# Patient Record
Sex: FEMALE | Race: WHITE | Employment: OTHER | URBAN - METROPOLITAN AREA
[De-identification: names, ages, dates, MRNs, and addresses within clinical notes are randomized per-mention and may not be internally consistent; named-entity substitution may affect disease eponyms.]

---

## 2018-12-05 ENCOUNTER — HOSPITAL ENCOUNTER (OUTPATIENT)
Dept: MAMMOGRAPHY | Age: 78
Discharge: HOME | End: 2018-12-05
Attending: INTERNAL MEDICINE
Payer: MEDICARE

## 2018-12-05 DIAGNOSIS — M89.9 DISORDER OF BONE, UNSPECIFIED: ICD-10-CM

## 2018-12-05 PROCEDURE — 77080 DXA BONE DENSITY AXIAL: CPT

## 2019-03-15 RX ORDER — ZOLEDRONIC ACID 5 MG/100ML
5 INJECTION, SOLUTION INTRAVENOUS ONCE
Status: DISCONTINUED | OUTPATIENT
Start: 2019-03-20 | End: 2019-03-20

## 2019-03-20 ENCOUNTER — HOSPITAL ENCOUNTER (OUTPATIENT)
Dept: INFUSION THERAPY | Age: 79
Discharge: HOME | End: 2019-03-20

## 2019-03-20 VITALS
SYSTOLIC BLOOD PRESSURE: 153 MMHG | OXYGEN SATURATION: 100 % | RESPIRATION RATE: 18 BRPM | TEMPERATURE: 97.8 F | HEART RATE: 76 BPM | DIASTOLIC BLOOD PRESSURE: 81 MMHG

## 2019-03-20 RX ORDER — LEVOTHYROXINE SODIUM 25 UG/1
40 TABLET ORAL
COMMUNITY
End: 2019-08-02 | Stop reason: SDUPTHER

## 2019-03-20 RX ORDER — OMEPRAZOLE 40 MG/1
40 CAPSULE, DELAYED RELEASE ORAL DAILY
Status: ON HOLD | COMMUNITY
End: 2022-10-03

## 2019-03-20 NOTE — PROGRESS NOTES
Art SMITH SHORT VISIT NOTE    12 Pt arrived to [facility] ambulatory and in no distress for Reclast.  Denies any new complaints. Unable to obtain a peripheral IV. Encouraged pt to drink plenty of water the day before her infusion. Pt rescheduled for April 4. Patient Vitals for the past 12 hrs:   Temp Pulse Resp BP SpO2   03/20/19 1114 97.8 °F (36.6 °C) 76 18 153/81 100 %     1135 Discharged home ambulatory and in no distress. Tolerated treatment well.  Next appointment 4/4/19 @ 130pm

## 2019-03-22 ENCOUNTER — HOSPITAL ENCOUNTER (OUTPATIENT)
Dept: GENERAL RADIOLOGY | Age: 79
Discharge: HOME | End: 2019-03-22
Attending: INTERNAL MEDICINE
Payer: MEDICARE

## 2019-03-22 DIAGNOSIS — R13.10 DYSPHAGIA: ICD-10-CM

## 2019-03-22 PROCEDURE — 92611 MOTION FLUOROSCOPY/SWALLOW: CPT

## 2019-03-22 PROCEDURE — 74230 X-RAY XM SWLNG FUNCJ C+: CPT

## 2019-03-22 NOTE — PROGRESS NOTES
[facility]  [address], [address]    Speech Pathology Modified barium swallow Study  Patient: Radha Castellano (86 y.o. female)  Date: 3/22/2019  Referring Provider:  Brian Dutta:   Patient c/o a few months of nocturnal wakenings with gasping for air and mucous, choking on various foods that often include vinegar, crumbly foods. R/o regurgitation of acid at times. She had EGD that showed small Hiatal hernia  And Mcgraw's esophagus. She is working on compensatory strategies for GERD and taking PPI. OBJECTIVE:   Past Medical History: her dad had PD with dysphagia at end of life. No past medical history on file. No past surgical history on file. Current Dietary Status:  Regular, thins  Radiologist: Augie Amos  Film Views: Lateral  Patient Position: upright standing    Trial 1: Trial 2:   Consistency Presented: All consistencies; Thin liquid; Solid;Puree;Pill/Tablet     How Presented: SLP-fed/presented;Straw;Successive swallows      ORAL PHASE:      Bolus Acceptance: No impairment     Bolus Formation/Control: No impairment:    :     Propulsion: No impairment     Oral Residue: None   PHARYNGEAL PHASE:      Initiation of Swallow: No impairment     Timing: No impairment     Penetration: None     Aspiration/Timing: No evidence of aspiration     Pharyngeal Clearance: No residue                               Trial 3: Trial 4:                            :    :                                                                        Decreased Tongue Base Retraction?: No  Laryngeal Elevation: WFL (within functional limits)  Aspiration/Penetration Score: 1 (No penetration or aspiration-Contrast does not enter the airway)                     ASSESSMENT :  Based on the objective data described above, the patient presents with normal oral-pharyngeal swallow. Her c/o dysphagia are highly correlated with GERD, which has been confirmed by GI MD. . PLAN/RECOMMENDATIONS :  Diet as tolerated. We discussed various compensatory strategies for GERD and recommended she continue to read GERD information packet provided by GI.       COMMUNICATION/EDUCATION:   The above findings and recommendations were discussed with: patient who verbalized understanding.     Thank you for this referral.  Tom To, SLP  Time Calculation: 20 mins

## 2019-04-01 RX ORDER — ZOLEDRONIC ACID 5 MG/100ML
5 INJECTION, SOLUTION INTRAVENOUS ONCE
Status: COMPLETED | OUTPATIENT
Start: 2019-04-04 | End: 2019-04-04

## 2019-04-04 ENCOUNTER — HOSPITAL ENCOUNTER (OUTPATIENT)
Dept: INFUSION THERAPY | Age: 79
Discharge: HOME | End: 2019-04-04
Payer: MEDICARE

## 2019-04-04 VITALS
HEIGHT: 60 IN | RESPIRATION RATE: 18 BRPM | WEIGHT: 178 LBS | HEART RATE: 80 BPM | BODY MASS INDEX: 34.95 KG/M2 | SYSTOLIC BLOOD PRESSURE: 131 MMHG | TEMPERATURE: 98.1 F | DIASTOLIC BLOOD PRESSURE: 63 MMHG

## 2019-04-04 LAB
ALBUMIN SERPL-MCNC: 3.6 G/DL (ref 3.5–5)
ANION GAP SERPL CALC-SCNC: 5 MMOL/L (ref 5–15)
BUN SERPL-MCNC: 22 MG/DL (ref 6–20)
BUN/CREAT SERPL: 24 (ref 12–20)
CALCIUM SERPL-MCNC: 8.8 MG/DL (ref 8.5–10.1)
CHLORIDE SERPL-SCNC: 107 MMOL/L (ref 97–108)
CO2 SERPL-SCNC: 26 MMOL/L (ref 21–32)
CREAT SERPL-MCNC: 0.9 MG/DL (ref 0.55–1.02)
GLUCOSE SERPL-MCNC: 117 MG/DL (ref 65–100)
PHOSPHATE SERPL-MCNC: 3.6 MG/DL (ref 2.6–4.7)
POTASSIUM SERPL-SCNC: 4.3 MMOL/L (ref 3.5–5.1)
SODIUM SERPL-SCNC: 138 MMOL/L (ref 136–145)

## 2019-04-04 PROCEDURE — 36415 COLL VENOUS BLD VENIPUNCTURE: CPT

## 2019-04-04 PROCEDURE — 80069 RENAL FUNCTION PANEL: CPT

## 2019-04-04 PROCEDURE — 74011250636 HC RX REV CODE- 250/636: Performed by: INTERNAL MEDICINE

## 2019-04-04 PROCEDURE — 96365 THER/PROPH/DIAG IV INF INIT: CPT

## 2019-04-04 RX ADMIN — ZOLEDRONIC ACID 5 MG: 5 INJECTION, SOLUTION INTRAVENOUS at 15:02

## 2019-04-04 NOTE — PROGRESS NOTES
[facility] VISIT NOTE 
 
1330 Pt arrived at Health system ambulatory and in no distress for Reclast.  Assessment completed, pt w/o complaints. PIV LFA 24 G + blood return. Positive blood return noted and labs drawn. Medications received: 
Reclast IV Tolerated treatment well, no adverse reaction noted. PIV removed gauze and band aid applied. Recent Results (from the past 12 hour(s)) RENAL FUNCTION PANEL Collection Time: 04/04/19  2:02 PM  
Result Value Ref Range Sodium 138 136 - 145 mmol/L Potassium 4.3 3.5 - 5.1 mmol/L Chloride 107 97 - 108 mmol/L  
 CO2 26 21 - 32 mmol/L Anion gap 5 5 - 15 mmol/L Glucose 117 (H) 65 - 100 mg/dL BUN 22 (H) 6 - 20 MG/DL Creatinine 0.90 0.55 - 1.02 MG/DL  
 BUN/Creatinine ratio 24 (H) 12 - 20 GFR est AA >60 >60 ml/min/1.73m2 GFR est non-AA >60 >60 ml/min/1.73m2 Calcium 8.8 8.5 - 10.1 MG/DL Phosphorus 3.6 2.6 - 4.7 MG/DL Albumin 3.6 3.5 - 5.0 g/dL Patient Vitals for the past 12 hrs: 
 Temp Pulse Resp BP  
04/04/19 1331 98.1 °F (36.7 °C) 80 18 131/63  
 
1530 D/C'd from Health system ambulatory and in no distress accompanied by . Next appointment is 3/18/20.

## 2019-08-02 ENCOUNTER — OFFICE VISIT (OUTPATIENT)
Dept: CARDIOLOGY CLINIC | Age: 79
End: 2019-08-02

## 2019-08-02 VITALS
SYSTOLIC BLOOD PRESSURE: 130 MMHG | OXYGEN SATURATION: 93 % | HEART RATE: 78 BPM | BODY MASS INDEX: 34.87 KG/M2 | RESPIRATION RATE: 16 BRPM | WEIGHT: 177.6 LBS | HEIGHT: 60 IN | DIASTOLIC BLOOD PRESSURE: 80 MMHG

## 2019-08-02 DIAGNOSIS — R00.2 PALPITATIONS: Primary | ICD-10-CM

## 2019-08-02 DIAGNOSIS — I10 ESSENTIAL HYPERTENSION: ICD-10-CM

## 2019-08-02 RX ORDER — MELOXICAM 7.5 MG/1
TABLET ORAL
Status: ON HOLD | COMMUNITY
Start: 2019-07-31 | End: 2022-10-03

## 2019-08-02 RX ORDER — LEVOTHYROXINE SODIUM 88 UG/1
88 TABLET ORAL
COMMUNITY

## 2019-08-02 RX ORDER — PANTOPRAZOLE SODIUM 40 MG/1
TABLET, DELAYED RELEASE ORAL
Refills: 3 | COMMUNITY
Start: 2019-07-07

## 2019-08-02 NOTE — PROGRESS NOTES
Reason for Consult: Palpitation. Referring: Alonzo Rodriguez     HPI: Robb Bro is a 67 y.o. female with history of hypothyroidism, gastric reflux disease and hiatal hernia is here for evaluation of symptoms of palpitations. On July 26, 2019 she was getting ready to go for dinner when she experienced symptoms of palpitations which she describes as heart racing sensation in her left anterior chest wall. She also felt some weakness in her legs. Symptoms lasted for few minutes. Symptoms spontaneously resolved. She went to see a [facility] and had preliminary work-up including some lab results including CBC, CMP which were normal.  She had an EKG performed on that day which demonstrated normal sinus rhythm with normal EKG. She was asked to see a cardiologist thereafter. Since then she has not had any new symptoms of palpitations. No symptoms of chest pain, lightheadedness or dizziness. No previous cardiac history. No significant previous cardiac testing. No history of tobacco smoking. No family history of heart disease. EKG from July 26, 2019 was personally reviewed. EKG is from [facility].  EKG demonstrated normal sinus rhythm, heart rate of 84 bpm, normal ST segment, normal axis, normal intervals, normal T waves. Lab results from [facility] were  personally reviewed. Lab results are normal.  Next TSH is not available. Plan:    1. Palpitations: Symptoms could be related to ectopies versus atrial tachycardia or A. fib. Need to perform a 24-hour Holter monitor. Also check echocardiogram for cardiac structure and valvular function. At this time no medication is indicated unless she has significant and recurrent symptoms.                   Social History     Socioeconomic History    Marital status:      Spouse name: Not on file    Number of children: Not on file    Years of education: Not on file    Highest education level: Not on file   Occupational History    Not on file   Social Needs    Financial resource strain: Not on file    Food insecurity:     Worry: Not on file     Inability: Not on file    Transportation needs:     Medical: Not on file     Non-medical: Not on file   Tobacco Use    Smoking status: Never Smoker    Smokeless tobacco: Never Used   Substance and Sexual Activity    Alcohol use: Yes     Frequency: Never    Drug use: Never    Sexual activity: Not on file   Lifestyle    Physical activity:     Days per week: Not on file     Minutes per session: Not on file    Stress: Not on file   Relationships    Social connections:     Talks on phone: Not on file     Gets together: Not on file     Attends [employer] service: Not on file     Active member of club or organization: Not on file     Attends meetings of clubs or organizations: Not on file     Relationship status: Not on file    Intimate partner violence:     Fear of current or ex partner: Not on file     Emotionally abused: Not on file     Physically abused: Not on file     Forced sexual activity: Not on file   Other Topics Concern    Not on file   Social History Narrative    Not on file         Not on File         Current Outpatient Medications   Medication Sig Dispense Refill    levothyroxine (SYNTHROID) 75 mcg tablet levothyroxine 75 mcg tablet      pantoprazole (PROTONIX) 40 mg tablet TAKE 1 TABLET BY MOUTH EVERY MORNING 30 MIN BEFORE BREAKFAST  3    meloxicam (MOBIC) 7.5 mg tablet       omeprazole (PRILOSEC) 40 mg capsule Take 40 mg by mouth daily. ROS:  12 point review of systems was performed.  All negative except for HPI     Physical Exam:  Visit Vitals  /80 (BP 1 Location: Left arm, BP Patient Position: Sitting)   Pulse 78   Resp 16   Ht 5' (1.524 m)   Wt 177 lb 9.6 oz (80.6 kg)   SpO2 93%   BMI 34.69 kg/m²       Gen:  Well-developed, well-nourished, in no acute distress  HEENT:  Pink conjunctivae, PERRL, hearing intact to voice, moist mucous membranes  Neck:  Supple, without masses, thyroid non-tender  Resp:  No accessory muscle use, clear breath sounds without wheezes rales or rhonchi  Card:  No murmurs, normal S1, S2 without thrills, bruits or peripheral edema  Abd:  Soft, non-tender, non-distended, normoactive bowel sounds are present, no palpable organomegaly and no detectable hernias  Lymph:  No cervical or inguinal adenopathy  Musc:  No cyanosis or clubbing  Skin:  No rashes or ulcers, skin turgor is good  Neuro:  Cranial nerves are grossly intact, no focal motor weakness, follows commands appropriately  Psych:  Good insight, oriented to person, place and time, alert     Labs:     No results found for: WBC, WBCT, WBCPOC, HGB, HGBPOC, HCT, HCTPOC, PLT, PLTPOC, MCV, MCVPOC, HGBEXT, HCTEXT, PLTEXT  Lab Results   Component Value Date/Time    Glucose 117 (H) 04/04/2019 02:02 PM    Creatinine 0.90 04/04/2019 02:02 PM      No results found for: CHOL, CHOLPOCT, HDL, LDL, LDLC, LDLCPOC, LDLCEXT, TRIGL, TGLPOCT, CHHD, CHHDX  Lab Results   Component Value Date/Time    Albumin 3.6 04/04/2019 02:02 PM     No results found for: INR, PTMR, PTP, PT1, PT2   Lab Results   Component Value Date/Time    GFR est non-AA >60 04/04/2019 02:02 PM    GFR est AA >60 04/04/2019 02:02 PM    Creatinine 0.90 04/04/2019 02:02 PM    BUN 22 (H) 04/04/2019 02:02 PM    Sodium 138 04/04/2019 02:02 PM    Potassium 4.3 04/04/2019 02:02 PM    Chloride 107 04/04/2019 02:02 PM    CO2 26 04/04/2019 02:02 PM    Phosphorus 3.6 04/04/2019 02:02 PM     No results found for: PSA, PSA2, PSAR1, PSA1, PSAR2, PSA3, PSAR3, [id number], [id number], PSALT  No results found for: TSH, TSH2, TSH3, TSHP, TSHELE, TSHEXT, TT3, T3U, T3UP, FRT3, FT3, FT4, FT4P, T4, T4P, FT4T, TT7, TSHEXT   Lab Results   Component Value Date/Time    Glucose 117 (H) 04/04/2019 02:02 PM      No results found for: CPK, RCK1, RCK2, RCK3, RCK4, CKMB, CKNDX, CKND1, TROPT, TROIQ, BNPP, BNP   No results found for: BNP, BNPP, BNPPPOC, XBNPT, BNPNT   Lab Results   Component Value Date/Time    Sodium 138 04/04/2019 02:02 PM    Potassium 4.3 04/04/2019 02:02 PM    Chloride 107 04/04/2019 02:02 PM    CO2 26 04/04/2019 02:02 PM    Anion gap 5 04/04/2019 02:02 PM    Glucose 117 (H) 04/04/2019 02:02 PM    BUN 22 (H) 04/04/2019 02:02 PM    Creatinine 0.90 04/04/2019 02:02 PM    BUN/Creatinine ratio 24 (H) 04/04/2019 02:02 PM    GFR est AA >60 04/04/2019 02:02 PM    GFR est non-AA >60 04/04/2019 02:02 PM    Calcium 8.8 04/04/2019 02:02 PM      Lab Results   Component Value Date/Time    Sodium 138 04/04/2019 02:02 PM    Potassium 4.3 04/04/2019 02:02 PM    Chloride 107 04/04/2019 02:02 PM    CO2 26 04/04/2019 02:02 PM    Anion gap 5 04/04/2019 02:02 PM    Glucose 117 (H) 04/04/2019 02:02 PM    BUN 22 (H) 04/04/2019 02:02 PM    Creatinine 0.90 04/04/2019 02:02 PM    BUN/Creatinine ratio 24 (H) 04/04/2019 02:02 PM    GFR est AA >60 04/04/2019 02:02 PM    GFR est non-AA >60 04/04/2019 02:02 PM    Calcium 8.8 04/04/2019 02:02 PM    Albumin 3.6 04/04/2019 02:02 PM      No results found for: HBA1C, PAM4EWMN, HGBE8, KNZ9DVZS, FNY3QOLT      No results for input(s): CPK, CKMB, TROIQ in the last 72 hours. No lab exists for component: CKQMB, CPKMB        Problem List:     Problem List  Date Reviewed: 8/2/2019    None            Matt Wade MD, [facility] [place]

## 2019-08-02 NOTE — PROGRESS NOTES
Visit Vitals  /80 (BP 1 Location: Left arm, BP Patient Position: Sitting)   Pulse 78   Resp 16   Ht 5' (1.524 m)   Wt 177 lb 9.6 oz (80.6 kg)   SpO2 93%   BMI 34.69 kg/m²

## 2019-08-05 ENCOUNTER — CLINICAL SUPPORT (OUTPATIENT)
Dept: CARDIOLOGY CLINIC | Age: 79
End: 2019-08-05

## 2019-08-05 DIAGNOSIS — R00.2 PALPITATIONS: ICD-10-CM

## 2019-08-05 NOTE — PROGRESS NOTES
Applied 24 hr holter per Dr Dottie King dx: palps. Pt has #[id number] & due back on 8/6. Chargeable visit.   [employer]

## 2019-08-12 ENCOUNTER — TELEPHONE (OUTPATIENT)
Dept: CARDIOLOGY CLINIC | Age: 79
End: 2019-08-12

## 2019-08-12 NOTE — TELEPHONE ENCOUNTER
Patient would like a call regarding the echo results that she received through [employer]. Patient would also like to know the results from the Holter Monitor when we have the final results. .    Phone: [phone] (before 1:30 pm)   Phone: [phone] (after 1:30 pm)

## 2019-08-12 NOTE — TELEPHONE ENCOUNTER
Holter results:    1 PVC  15 Atrail Ectopy  No SVT    Plan:    No major findings. No further treatment necessary. Matt Caro MD, [facility] [place]

## 2019-08-13 NOTE — TELEPHONE ENCOUNTER
Call placed to discuss holter and echo results with patient per VO of Dr. Elisa William. (IDx2). Patient informed of normal results. Decrease stress, caffeine, and maintain proper hydration. See Dr. Elisa William PRN. Patient expressed understanding of the plan. Opportunity for questions and concerns provided.

## 2019-10-10 ENCOUNTER — HOSPITAL ENCOUNTER (OUTPATIENT)
Dept: MRI IMAGING | Age: 79
Discharge: HOME | End: 2019-10-10
Attending: ORTHOPAEDIC SURGERY
Payer: MEDICARE

## 2019-10-10 DIAGNOSIS — M51.36 DEGENERATION OF LUMBAR INTERVERTEBRAL DISC: ICD-10-CM

## 2019-10-10 DIAGNOSIS — M54.50 LUMBAGO: ICD-10-CM

## 2019-10-10 PROCEDURE — 72148 MRI LUMBAR SPINE W/O DYE: CPT

## 2020-03-18 ENCOUNTER — HOSPITAL ENCOUNTER (OUTPATIENT)
Dept: INFUSION THERAPY | Age: 80
End: 2020-03-18

## 2021-07-06 ENCOUNTER — TRANSCRIBE ORDER (OUTPATIENT)
Dept: SCHEDULING | Age: 81
End: 2021-07-06

## 2021-07-06 DIAGNOSIS — M81.0 SENILE OSTEOPOROSIS: Primary | ICD-10-CM

## 2021-07-15 ENCOUNTER — HOSPITAL ENCOUNTER (OUTPATIENT)
Dept: MAMMOGRAPHY | Age: 81
Discharge: HOME | End: 2021-07-15
Attending: INTERNAL MEDICINE
Payer: MEDICARE

## 2021-07-15 DIAGNOSIS — M81.0 SENILE OSTEOPOROSIS: ICD-10-CM

## 2021-07-15 PROCEDURE — 77080 DXA BONE DENSITY AXIAL: CPT

## 2021-12-20 LAB — SARS-COV-2, NAA: NOT DETECTED

## 2022-05-05 LAB — SARS-COV-2, NAA: POSITIVE

## 2022-10-03 ENCOUNTER — HOSPITAL ENCOUNTER (OUTPATIENT)
Age: 82
Setting detail: OBSERVATION
Discharge: HOME | End: 2022-10-04
Attending: EMERGENCY MEDICINE | Admitting: INTERNAL MEDICINE
Payer: MEDICARE

## 2022-10-03 ENCOUNTER — APPOINTMENT (OUTPATIENT)
Dept: MRI IMAGING | Age: 82
End: 2022-10-03
Attending: INTERNAL MEDICINE
Payer: MEDICARE

## 2022-10-03 ENCOUNTER — APPOINTMENT (OUTPATIENT)
Dept: GENERAL RADIOLOGY | Age: 82
End: 2022-10-03
Attending: EMERGENCY MEDICINE
Payer: MEDICARE

## 2022-10-03 ENCOUNTER — HOSPITAL ENCOUNTER (EMERGENCY)
Dept: CT IMAGING | Age: 82
Discharge: HOME | End: 2022-10-03
Attending: EMERGENCY MEDICINE
Payer: MEDICARE

## 2022-10-03 DIAGNOSIS — R27.0 ATAXIA: ICD-10-CM

## 2022-10-03 DIAGNOSIS — R42 VERTIGO: Primary | ICD-10-CM

## 2022-10-03 LAB
ALBUMIN SERPL-MCNC: 4.4 G/DL (ref 3.5–5.2)
ALBUMIN/GLOB SERPL: 1.8 {RATIO} (ref 1.1–2.2)
ALP SERPL-CCNC: 71 U/L (ref 35–104)
ALT SERPL-CCNC: 18 U/L (ref 10–35)
AMPHET UR QL SCN: NEGATIVE
ANION GAP SERPL CALC-SCNC: 14 MMOL/L (ref 5–15)
APPEARANCE UR: CLEAR
AST SERPL-CCNC: 15 U/L (ref 10–35)
BACTERIA URNS QL MICRO: NEGATIVE /HPF
BARBITURATES UR QL SCN: NEGATIVE
BASOPHILS # BLD: 0.1 K/UL (ref 0–0.1)
BASOPHILS NFR BLD: 1 % (ref 0–1)
BENZODIAZ UR QL: NEGATIVE
BILIRUB SERPL-MCNC: 0.2 MG/DL (ref 0.2–1)
BILIRUB UR QL: NEGATIVE
BUN SERPL-MCNC: 21 MG/DL (ref 8–23)
BUN/CREAT SERPL: 22 (ref 12–20)
CALCIUM SERPL-MCNC: 9.5 MG/DL (ref 8.8–10.2)
CANNABINOIDS UR QL SCN: NEGATIVE
CHLORIDE SERPL-SCNC: 102 MMOL/L (ref 98–107)
CO2 SERPL-SCNC: 20 MMOL/L (ref 22–29)
COCAINE UR QL SCN: NEGATIVE
COLOR UR: ABNORMAL
CREAT SERPL-MCNC: 0.94 MG/DL (ref 0.5–0.9)
DIFFERENTIAL METHOD BLD: ABNORMAL
DRUG SCRN COMMENT,DRGCM: NORMAL
EOSINOPHIL # BLD: 0.1 K/UL (ref 0–0.4)
EOSINOPHIL NFR BLD: 1 % (ref 0–7)
EPITH CASTS URNS QL MICRO: ABNORMAL /LPF
ERYTHROCYTE [DISTWIDTH] IN BLOOD BY AUTOMATED COUNT: 14.9 % (ref 11.5–14.5)
GLOBULIN SER CALC-MCNC: 2.5 G/DL (ref 2–4)
GLUCOSE SERPL-MCNC: 132 MG/DL (ref 65–100)
GLUCOSE UR STRIP.AUTO-MCNC: NEGATIVE MG/DL
HCT VFR BLD AUTO: 41.7 % (ref 35–47)
HGB BLD-MCNC: 13.9 G/DL (ref 11.5–16)
HGB UR QL STRIP: NEGATIVE
IMM GRANULOCYTES # BLD AUTO: 0 K/UL (ref 0–0.04)
IMM GRANULOCYTES NFR BLD AUTO: 0 % (ref 0–0.5)
KETONES UR QL STRIP.AUTO: 15 MG/DL
LEUKOCYTE ESTERASE UR QL STRIP.AUTO: ABNORMAL
LYMPHOCYTES # BLD: 2.4 K/UL (ref 0.8–3.5)
LYMPHOCYTES NFR BLD: 30 % (ref 12–49)
MAGNESIUM SERPL-MCNC: 2.1 MG/DL (ref 1.6–2.4)
MCH RBC QN AUTO: 29.4 PG (ref 26–34)
MCHC RBC AUTO-ENTMCNC: 33.3 G/DL (ref 30–36.5)
MCV RBC AUTO: 88.2 FL (ref 80–99)
METHADONE UR QL: NEGATIVE
MONOCYTES # BLD: 0.6 K/UL (ref 0–1)
MONOCYTES NFR BLD: 7 % (ref 5–13)
NEUTS SEG # BLD: 5 K/UL (ref 1.8–8)
NEUTS SEG NFR BLD: 61 % (ref 32–75)
NITRITE UR QL STRIP.AUTO: NEGATIVE
NRBC # BLD: 0 K/UL (ref 0–0.01)
NRBC BLD-RTO: 0 PER 100 WBC
OPIATES UR QL: NEGATIVE
PCP UR QL: NEGATIVE
PH UR STRIP: 5.5 [PH] (ref 5–8)
PLATELET # BLD AUTO: 241 K/UL (ref 150–400)
PMV BLD AUTO: 10.4 FL (ref 8.9–12.9)
POTASSIUM SERPL-SCNC: 4.3 MMOL/L (ref 3.5–5.1)
PROT SERPL-MCNC: 6.9 G/DL (ref 6.4–8.3)
PROT UR STRIP-MCNC: NEGATIVE MG/DL
RBC # BLD AUTO: 4.73 M/UL (ref 3.8–5.2)
RBC #/AREA URNS HPF: ABNORMAL /HPF
SODIUM SERPL-SCNC: 136 MMOL/L (ref 136–145)
SP GR UR REFRACTOMETRY: 1.01 (ref 1–1.03)
TROPONIN I BLD-MCNC: <0.04 NG/ML (ref 0–0.08)
UROBILINOGEN UR QL STRIP.AUTO: 0.2 EU/DL (ref 0.2–1)
WBC # BLD AUTO: 8.1 K/UL (ref 3.6–11)
WBC URNS QL MICRO: ABNORMAL /HPF (ref 0–4)

## 2022-10-03 PROCEDURE — 96372 THER/PROPH/DIAG INJ SC/IM: CPT

## 2022-10-03 PROCEDURE — 96374 THER/PROPH/DIAG INJ IV PUSH: CPT

## 2022-10-03 PROCEDURE — 81001 URINALYSIS AUTO W/SCOPE: CPT

## 2022-10-03 PROCEDURE — G0378 HOSPITAL OBSERVATION PER HR: HCPCS

## 2022-10-03 PROCEDURE — 36415 COLL VENOUS BLD VENIPUNCTURE: CPT

## 2022-10-03 PROCEDURE — 74011000636 HC RX REV CODE- 636: Performed by: EMERGENCY MEDICINE

## 2022-10-03 PROCEDURE — 72125 CT NECK SPINE W/O DYE: CPT

## 2022-10-03 PROCEDURE — 74011250636 HC RX REV CODE- 250/636: Performed by: INTERNAL MEDICINE

## 2022-10-03 PROCEDURE — 80053 COMPREHEN METABOLIC PANEL: CPT

## 2022-10-03 PROCEDURE — 83735 ASSAY OF MAGNESIUM: CPT

## 2022-10-03 PROCEDURE — 74011000250 HC RX REV CODE- 250: Performed by: INTERNAL MEDICINE

## 2022-10-03 PROCEDURE — 70450 CT HEAD/BRAIN W/O DYE: CPT

## 2022-10-03 PROCEDURE — 84484 ASSAY OF TROPONIN QUANT: CPT

## 2022-10-03 PROCEDURE — 80307 DRUG TEST PRSMV CHEM ANLYZR: CPT

## 2022-10-03 PROCEDURE — 70551 MRI BRAIN STEM W/O DYE: CPT

## 2022-10-03 PROCEDURE — 99285 EMERGENCY DEPT VISIT HI MDM: CPT

## 2022-10-03 PROCEDURE — 74011250637 HC RX REV CODE- 250/637: Performed by: EMERGENCY MEDICINE

## 2022-10-03 PROCEDURE — 70496 CT ANGIOGRAPHY HEAD: CPT

## 2022-10-03 PROCEDURE — 74011250637 HC RX REV CODE- 250/637: Performed by: INTERNAL MEDICINE

## 2022-10-03 PROCEDURE — 93005 ELECTROCARDIOGRAM TRACING: CPT

## 2022-10-03 PROCEDURE — 85025 COMPLETE CBC W/AUTO DIFF WBC: CPT

## 2022-10-03 PROCEDURE — 71045 X-RAY EXAM CHEST 1 VIEW: CPT

## 2022-10-03 PROCEDURE — 74011250636 HC RX REV CODE- 250/636: Performed by: EMERGENCY MEDICINE

## 2022-10-03 RX ORDER — SODIUM CHLORIDE 0.9 % (FLUSH) 0.9 %
5-40 SYRINGE (ML) INJECTION AS NEEDED
Status: DISCONTINUED | OUTPATIENT
Start: 2022-10-03 | End: 2022-10-04 | Stop reason: HOSPADM

## 2022-10-03 RX ORDER — BISMUTH SUBSALICYLATE 262 MG
1 TABLET,CHEWABLE ORAL DAILY
COMMUNITY

## 2022-10-03 RX ORDER — ONDANSETRON 4 MG/1
4 TABLET, ORALLY DISINTEGRATING ORAL
Status: DISCONTINUED | OUTPATIENT
Start: 2022-10-03 | End: 2022-10-04 | Stop reason: HOSPADM

## 2022-10-03 RX ORDER — ACETAMINOPHEN 650 MG/1
650 SUPPOSITORY RECTAL
Status: DISCONTINUED | OUTPATIENT
Start: 2022-10-03 | End: 2022-10-04 | Stop reason: HOSPADM

## 2022-10-03 RX ORDER — SODIUM CHLORIDE 0.9 % (FLUSH) 0.9 %
5-40 SYRINGE (ML) INJECTION EVERY 8 HOURS
Status: DISCONTINUED | OUTPATIENT
Start: 2022-10-03 | End: 2022-10-03 | Stop reason: SDUPTHER

## 2022-10-03 RX ORDER — ENOXAPARIN SODIUM 100 MG/ML
40 INJECTION SUBCUTANEOUS DAILY
Status: DISCONTINUED | OUTPATIENT
Start: 2022-10-03 | End: 2022-10-04 | Stop reason: HOSPADM

## 2022-10-03 RX ORDER — GUAIFENESIN 100 MG/5ML
81 LIQUID (ML) ORAL DAILY
COMMUNITY

## 2022-10-03 RX ORDER — POLYETHYLENE GLYCOL 3350 17 G/17G
17 POWDER, FOR SOLUTION ORAL DAILY PRN
Status: DISCONTINUED | OUTPATIENT
Start: 2022-10-03 | End: 2022-10-04 | Stop reason: HOSPADM

## 2022-10-03 RX ORDER — NALOXONE HYDROCHLORIDE 0.4 MG/ML
0.4 INJECTION, SOLUTION INTRAMUSCULAR; INTRAVENOUS; SUBCUTANEOUS AS NEEDED
Status: DISCONTINUED | OUTPATIENT
Start: 2022-10-03 | End: 2022-10-04 | Stop reason: HOSPADM

## 2022-10-03 RX ORDER — SODIUM CHLORIDE 0.9 % (FLUSH) 0.9 %
5-40 SYRINGE (ML) INJECTION EVERY 8 HOURS
Status: DISCONTINUED | OUTPATIENT
Start: 2022-10-03 | End: 2022-10-04 | Stop reason: HOSPADM

## 2022-10-03 RX ORDER — CEFDINIR 300 MG/1
300 CAPSULE ORAL EVERY 12 HOURS
Status: DISCONTINUED | OUTPATIENT
Start: 2022-10-03 | End: 2022-10-04 | Stop reason: HOSPADM

## 2022-10-03 RX ORDER — GUAIFENESIN 100 MG/5ML
81 LIQUID (ML) ORAL DAILY
Status: DISCONTINUED | OUTPATIENT
Start: 2022-10-03 | End: 2022-10-04 | Stop reason: HOSPADM

## 2022-10-03 RX ORDER — ACETAMINOPHEN 325 MG/1
650 TABLET ORAL
Status: DISCONTINUED | OUTPATIENT
Start: 2022-10-03 | End: 2022-10-04 | Stop reason: HOSPADM

## 2022-10-03 RX ORDER — DEXTROMETHORPHAN HYDROBROMIDE, GUAIFENESIN 5; 100 MG/5ML; MG/5ML
650 LIQUID ORAL EVERY 8 HOURS
COMMUNITY

## 2022-10-03 RX ORDER — HYDROCODONE BITARTRATE AND ACETAMINOPHEN 5; 325 MG/1; MG/1
1 TABLET ORAL
Status: DISCONTINUED | OUTPATIENT
Start: 2022-10-03 | End: 2022-10-04 | Stop reason: HOSPADM

## 2022-10-03 RX ORDER — ONDANSETRON 2 MG/ML
4 INJECTION INTRAMUSCULAR; INTRAVENOUS
Status: DISCONTINUED | OUTPATIENT
Start: 2022-10-03 | End: 2022-10-04 | Stop reason: HOSPADM

## 2022-10-03 RX ORDER — SULFAMETHOXAZOLE AND TRIMETHOPRIM 800; 160 MG/1; MG/1
1 TABLET ORAL
Status: DISCONTINUED | OUTPATIENT
Start: 2022-10-03 | End: 2022-10-03

## 2022-10-03 RX ORDER — MECLIZINE HYDROCHLORIDE 25 MG/1
25 TABLET ORAL
Status: COMPLETED | OUTPATIENT
Start: 2022-10-03 | End: 2022-10-03

## 2022-10-03 RX ORDER — ONDANSETRON 2 MG/ML
4 INJECTION INTRAMUSCULAR; INTRAVENOUS
Status: COMPLETED | OUTPATIENT
Start: 2022-10-03 | End: 2022-10-03

## 2022-10-03 RX ORDER — SODIUM CHLORIDE, SODIUM LACTATE, POTASSIUM CHLORIDE, CALCIUM CHLORIDE 600; 310; 30; 20 MG/100ML; MG/100ML; MG/100ML; MG/100ML
75 INJECTION, SOLUTION INTRAVENOUS CONTINUOUS
Status: DISCONTINUED | OUTPATIENT
Start: 2022-10-03 | End: 2022-10-04 | Stop reason: HOSPADM

## 2022-10-03 RX ORDER — MECLIZINE HCL 12.5 MG 12.5 MG/1
12.5 TABLET ORAL 3 TIMES DAILY
Status: DISCONTINUED | OUTPATIENT
Start: 2022-10-03 | End: 2022-10-04

## 2022-10-03 RX ADMIN — MECLIZINE HYDROCHLORIDE 25 MG: 25 TABLET ORAL at 01:32

## 2022-10-03 RX ADMIN — IOPAMIDOL 100 ML: 755 INJECTION, SOLUTION INTRAVENOUS at 02:34

## 2022-10-03 RX ADMIN — Medication 10 ML: at 06:00

## 2022-10-03 RX ADMIN — MECLIZINE 12.5 MG: 12.5 TABLET ORAL at 21:39

## 2022-10-03 RX ADMIN — SODIUM CHLORIDE, PRESERVATIVE FREE 10 ML: 5 INJECTION INTRAVENOUS at 12:41

## 2022-10-03 RX ADMIN — MECLIZINE 12.5 MG: 12.5 TABLET ORAL at 12:39

## 2022-10-03 RX ADMIN — MECLIZINE HYDROCHLORIDE 25 MG: 25 TABLET ORAL at 03:56

## 2022-10-03 RX ADMIN — CEFDINIR 300 MG: 300 CAPSULE ORAL at 09:00

## 2022-10-03 RX ADMIN — Medication 10 ML: at 21:39

## 2022-10-03 RX ADMIN — SODIUM CHLORIDE, POTASSIUM CHLORIDE, SODIUM LACTATE AND CALCIUM CHLORIDE 75 ML/HR: 600; 310; 30; 20 INJECTION, SOLUTION INTRAVENOUS at 12:40

## 2022-10-03 RX ADMIN — CEFDINIR 300 MG: 300 CAPSULE ORAL at 21:38

## 2022-10-03 RX ADMIN — SODIUM CHLORIDE 1000 ML: 9 INJECTION, SOLUTION INTRAVENOUS at 01:32

## 2022-10-03 RX ADMIN — ASPIRIN 81 MG: 81 TABLET, CHEWABLE ORAL at 12:40

## 2022-10-03 RX ADMIN — ONDANSETRON 4 MG: 2 INJECTION INTRAMUSCULAR; INTRAVENOUS at 01:38

## 2022-10-03 RX ADMIN — ENOXAPARIN SODIUM 40 MG: 100 INJECTION SUBCUTANEOUS at 09:00

## 2022-10-03 NOTE — ED TRIAGE NOTES
Patient states got up to use the bathroom this evening and felt dizzy while walking. States laid back down for a bit and when she tried to use the bathroom again she experienced dizziness and fell. States dizziness is worse with movement and feels nauseated as well.

## 2022-10-03 NOTE — PROGRESS NOTES
10/03/22      Medicare Outpatient Observation Notice (MOON)/ [facility] Outpatient Observation Notice (Pretty [place]ava[place]) provided to patient/representative with verbal explanation of the notice. Time allotted for questions regarding the notice. Patient /representative provided a completed copy of the MOON/VOON notice. Copy placed on bedside chart.

## 2022-10-03 NOTE — PROGRESS NOTES
CARE MANAGEMENT INITIAL ASSESSEMENT      NAME:   Vilma Mullins   :     1940   MRN:     [id number]       Emergency Contact:  Extended Emergency Contact Information  Primary Emergency Contact: Angelo Matos  Address: [address], [address] [place] [address] Phone: [phone]  Mobile Phone: [phone]  Relation: Spouse   needed? No    Advance Directive:  Full Code, has an advance directive. Copy not available. Beatrisrosalina Jhonson Healthcare Decision Maker:   Jose Alvarez- spouse- [phone]    Reason for Admission:  Ms. Teja David is a 81 y.o. female with history that includes no significant medical issues  who was emergently admitted for:  vertigo    Patient Active Problem List   Diagnosis Code    Vertigo R42       Assessment:  Via phone with patient. RUR:  N/A OBS  Risk Level:  N/A  Value-based purchasing:   No  Bundle patient:  No    Residency:  Private residence  Exterior Steps:   5  Interior Steps:   13-14. Pt's bedroom is upstairs. Pt denies any problems getting up/down stairs. Lives With:  Spouse/Significant Other    Prior functioning:  Independent. Patient requires assistance with:  N/A    Prior DME required:  Shower chair and Raised toilet seat    DME available:  Cane and Rolling walker    Rehab history:  None    Discharge Concerns/Barriers Identified:  None identified      Insurer:  Payor: VA MEDICARE / Plan: VA MEDICARE PART A & B / Product Type: Medicare /     PCP: None   Name of Practice:   N/A   Current patient: N/A   Approximate date of last visit: N/A   Access to virtual PCP visits:  N/A    Pharmacy:  [facility]   Financial/Difficulty affording medications:  None identified    COVID-19 vaccination status:  Fully vaccinated. Pt/family unable to recall dates. DC Transport:  Family      Transition of care plan:  Home with outpatient follow-up     Comments:   Pt admitted as OBS on 10/3/22 for vertigo.  CM completed initial assessment via phone. Pt lives at home with her spouse. Pt has no hx of HH or home O2. Pt has a raised toilet seat and shower chair at home. Pt also has access to a cane and walker that she does not use. Pt is independent with ADLs and ambulation. Pt denies any problems with ADLs. Pt reports being \"slow\" with ambulation. Pt is retired. Pt is able to drive. Pt denies having a PCP. Pt reports using urgent care when sick. Pt is agreeable to receiving [employer] PCP list.     Discharge plan is for Pt to return home. Family will transport Pt home.   _____________________________________  Liset Curry [address] [facility] [employer] [facility]  Available via [address] [place] [address]  10/3/2022   12:24 PM      Care Management Interventions  PCP Verified by CM: Yes (NO PCP)  Mode of Transport at Discharge:  Other (see comment) (family)  Transition of Care Consult (CM Consult): Discharge Planning  MyChart Signup: No  Discharge Durable Medical Equipment: No  Physical Therapy Consult: Yes  Occupational Therapy Consult: Yes  Speech Therapy Consult: No  Support Systems: Spouse/Significant Other, Child(juan c)  Confirm Follow Up Transport: Self  Discharge Location  Patient Expects to be Discharged to[de-identified] Home with family assistance

## 2022-10-03 NOTE — ED NOTES
This nurse and MD at bedside and attempted to ambulate Pt. Once pt sat up on side of bed she voiced feeling \" very dizzy\" and requested not to ambulate. Blood pressure also assessed once she changed position from lying to sitting and no significant drops in pressure noted.  Pt returned to bed with nurse assist.

## 2022-10-03 NOTE — H&P
[facility]  [address], [facility] [address]  [phone]    Admission History and Physical      NAME:  Juan Cook   :   1940   MRN:  [id number]     PCP:  None     Date/Time:  10/3/2022         Subjective:     CHIEF COMPLAINT: \"I just feel dizzy\"     HISTORY OF PRESENT ILLNESS:     Ms. Dahlia Dale is a 81 y.o.  female with remote h/o vertigo (nearly 30 years ago) admitted with dizziness, weakness and reports of weakness. Pt reports mostly dizziness when attempting to get out of bed. Also had an episode when getting up from commode. Also associated with nausea. Of note, pt recently treated for ear infection, shingles. Had complete therapy for this     PMH   Hypothyroidism  Vertigo     No past surgical history on file. Social History     Tobacco Use    Smoking status: Never    Smokeless tobacco: Never   Substance Use Topics    Alcohol use: Yes      FH   Parkinson's disease     Allergies   Allergen Reactions    Penicillins Hives        Prior to Admission medications    Medication Sig Start Date End Date Taking? Authorizing Provider   aspirin 81 mg chewable tablet Take 81 mg by mouth daily. Yes Provider, Historical   multivitamin (ONE A DAY) tablet Take 1 Tablet by mouth daily. Yes Provider, Historical   multivit-min/iron/folic/vin045 (HAIR, SKIN AND NAILS ADVANCED PO) Take 1 Tablet by mouth daily as needed. Yes Provider, Historical   acetaminophen (Tylenol Arthritis Pain) 650 mg TbER Take 650 mg by mouth every eight (8) hours. Indications: pain   Yes Provider, Historical   levothyroxine (SYNTHROID) 88 mcg tablet Take 88 mcg by mouth.    Yes Provider, Historical   pantoprazole (PROTONIX) 40 mg tablet As needed 19  Yes Provider, Historical         Review of Systems:  (bold if positive, if negative)    Gen:  Eyes:  ENT:  CVS:  Pulm:  GI:  :  MS:  Skin:  Psych:  Endo:  Hem:  Renal:  Neuro:     Weakness, dizziness        Objective:      VITALS: Vital signs reviewed; most recent are:    Visit Vitals  /76 (BP Patient Position: Standing)   Pulse (!) 105   Temp 98 °F (36.7 °C)   Resp 18   Ht 5' (1.524 m)   Wt 76.2 kg (168 lb)   SpO2 98%   BMI 32.81 kg/m²     SpO2 Readings from Last 6 Encounters:   10/03/22 98%   08/02/19 93%   03/20/19 100%          Intake/Output Summary (Last 24 hours) at 10/3/2022 1426  Last data filed at 10/3/2022 0232  Gross per 24 hour   Intake 1000 ml   Output --   Net 1000 ml            Exam:     Physical Exam:    Gen:  Well-developed, well-nourished, in no acute distress  HEENT:  Pink conjunctivae, PERRL, hearing intact to voice, moist mucous membranes  Neck:  Supple, without masses, thyroid non-tender  Resp:  No accessory muscle use, clear breath sounds without wheezes rales or rhonchi  Card:  No murmurs, normal S1, S2 without thrills, bruits or peripheral edema  Abd:  Soft, non-tender, non-distended, normoactive bowel sounds are present, no palpable organomegaly  Lymph:  No cervical adenopathy  Musc:  No cyanosis or clubbing  Skin:  No rashes or ulcers, skin turgor is good  Neuro:  Cranial nerves 3-12 are grossly intact,  strength is 5/5 bilaterally, dorsi / plantarflexion strength is 5/5 bilaterally, follows commands appropriately. No pronator drift. No past pointing. No resting tremor, cogwheel rigidity. 1-2 beat nystagmus   Psych: Slightly confused        Labs:    Recent Labs     10/03/22  0126   WBC 8.1   HGB 13.9   HCT 41.7        Recent Labs     10/03/22  0126      K 4.3      CO2 20*   *   BUN 21   CREA 0.94*   CA 9.5   MG 2.1   ALB 4.4   ALT 18     No components found for: GLPOC  No results for input(s): PH, PCO2, PO2, HCO3, FIO2 in the last 72 hours. No results for input(s): INR, INREXT in the last 72 hours. Head CT =>   No acute intracranial abnormality. Age-indeterminate mild microvascular ischemic  disease in the periventricular white matter. Head CTA =>   CTA   1.  CTA neck demonstrates no large vessel occlusion, high-grade stenosis or  aneurysm. 2. CTA head demonstrates no large vessel occlusion, high-grade stenosis or  aneurysm. 3. No intracranial mass or enhancing lesion. Assessment/Plan:    Dizziness - unclear etiology. Per description it seems to be more positional but NOT orthostatic on exam. Also no nystagmus that would be expected with vertigo (did eval her after meclizine). ? posterior CVA ? relation to alcohol   -gently hydrate   -continue orthostatics  -PT/OT eval   -schedule meclizine   -check MRI brain to rule out CVA    Weakness - pt with constellation of complaints including weakness.  Does not appear focal. ?deconditioning   -check TSH, B12/folate, ESR  -no prox muscle weakness to warrant CK   -MRI as above  -PT/OT eval as above     Hypothyroidism   -on levothyroxine     UTI - ?partially treated v. Contaminated sample  -on omnicef     Surrogate decision maker:      Total time spent with patient: [address] discussed with: Patient, Family, and Nursing Staff    Discussed:  Care Plan    Prophylaxis:  Lovenox    Probable Disposition:  Home w/Family           ___________________________________________________    Attending Physician: Carolyn Bradford MD

## 2022-10-03 NOTE — PROGRESS NOTES
Problem: Activity Intolerance  Goal: *Oxygen saturation during activity within specified parameters  Outcome: Progressing Towards Goal  Goal: *Able to remain out of bed as prescribed  Outcome: Progressing Towards Goal     Problem: Falls - Risk of  Goal: *Absence of Falls  Description: Document Sophie Marking Fall Risk and appropriate interventions in the flowsheet. Outcome: Progressing Towards Goal  Note: Fall Risk Interventions:  Mobility Interventions: Communicate number of staff needed for ambulation/transfer, OT consult for ADLs, Patient to call before getting OOB, PT Consult for mobility concerns, PT Consult for assist device competence, Strengthening exercises (ROM-active/passive), Utilize walker, cane, or other assistive device, Utilize gait belt for transfers/ambulation         Medication Interventions: Bed/chair exit alarm, Patient to call before getting OOB, Teach patient to arise slowly, Utilize gait belt for transfers/ambulation, Assess postural VS orthostatic hypotension, Evaluate medications/consider consulting pharmacy    Elimination Interventions:  Toileting schedule/hourly rounds, Toilet paper/wipes in reach, Stay With Me (per policy), Patient to call for help with toileting needs, Elevated toilet seat, Call light in reach, Bed/chair exit alarm    History of Falls Interventions: Bed/chair exit alarm, Consult care management for discharge planning, Door open when patient unattended, Investigate reason for fall, Room close to nurse's station, Utilize gait belt for transfer/ambulation, Assess for delayed presentation/identification of injury for 48 hrs (comment for end date), Vital signs minimum Q4HRs X 24 hrs (comment for end date)         Problem: Patient Education: Go to Patient Education Activity  Goal: Patient/Family Education  Outcome: Progressing Towards Goal     Problem: Nutrition Deficit  Goal: *Optimize nutritional status  Outcome: Progressing Towards Goal

## 2022-10-03 NOTE — ED NOTES
left bedside to return home for short duration. Phone number of  obtained and this nurse will notify him of any changes in regards to her admission or ETA of transport if occurs prior to return. Pt resting comfortably in bed at this time with lights dim.

## 2022-10-03 NOTE — ED NOTES
TRANSFER - OUT REPORT:    Verbal report given to [facility]) on Kristopher Wheeler  being transferred to [facility](unit) for routine progression of care       Report consisted of patients Situation, Background, Assessment and   Recommendations(SBAR). Information from the following report(s) SBAR, ED Summary, MAR, and Recent Results was reviewed with the receiving nurse. Lines:   Peripheral IV 10/03/22 Right Antecubital (Active)        Opportunity for questions and clarification was provided.       Patient transported with:  FELICIA

## 2022-10-03 NOTE — PROGRESS NOTES
Physical Therapy    Consult received, chart reviewed. Patient currently AB in MRI. Will follow back later or tomorrow as appropriate.     Vipin Barbour, MS, PT

## 2022-10-03 NOTE — ED PROVIDER NOTES
HPI   81-year-old female with a past medical history of hypothyroidism and GERD who presents to the emergency department due to vertigo and a fall. Patient tells me she has a history of vertigo. She says she has been seen by vestibular neurologist for this. When she got up to go to the bathroom in the middle of the night she felt vertigo. She told her  she laid back down. She went back to sleep but had to go to the bathroom again and this time her vertigo was worse. It caused her to fall. She did not hit her head or lose consciousness. She denies any pain. Since that time her vertigo has been persistent. She says this is worse vertigo than she has had in the past.  She has meclizine at home but did not take this. She denies any headache or visual changes. She cannot tell me what the neurologist told her the cause of her vertigo was. She is on a blood thinners. No numbness or weakness in her extremities. She denies any other prodromal symptoms causing her fall such as chest pain, palpitations, or shortness of breath. No recent vomiting or diarrhea. No past medical history on file. No past surgical history on file. No family history on file.     Social History     Socioeconomic History    Marital status:      Spouse name: Not on file    Number of children: Not on file    Years of education: Not on file    Highest education level: Not on file   Occupational History    Not on file   Tobacco Use    Smoking status: Never    Smokeless tobacco: Never   Substance and Sexual Activity    Alcohol use: Yes    Drug use: Never    Sexual activity: Not on file   Other Topics Concern    Not on file   Social History Narrative    Not on file     Social Determinants of Health     Financial Resource Strain: Not on file   Food Insecurity: Not on file   Transportation Needs: Not on file   Physical Activity: Not on file   Stress: Not on file   Social Connections: Not on file   Intimate Partner Violence: Not on file   Housing Stability: Not on file         ALLERGIES: Penicillins    Review of Systems  All systems reviewed were negative unless otherwise documented in the HPI    Vitals:    10/03/22 0058 10/03/22 0100 10/03/22 0103 10/03/22 0106   BP: (!) 179/78 (!) 175/85     Pulse: 90 90     Resp: 16 15     Temp:   97.7 °F (36.5 °C)    SpO2: 99%   99%   Weight:   76.2 kg (168 lb)    Height:   5' (1.524 m)             Physical Exam  Constitutional:       Comments: Not acutely distressed. Not diaphoretic   HENT:      Mouth/Throat:      Comments: Moist mucous membranes  Eyes:      General: No scleral icterus. Extraocular Movements: Extraocular movements intact. Comments: Pupils are 2 mm and reactive to light bilaterally. No nystagmus   Neck:      Comments: Trachea midline  Cardiovascular:      Comments: Regular rate and rhythm without murmurs  Pulmonary:      Effort: Pulmonary effort is normal.      Breath sounds: Normal breath sounds. Abdominal:      General: There is no distension. Palpations: Abdomen is soft. Tenderness: There is no abdominal tenderness. Musculoskeletal:         General: No deformity. Normal range of motion. Cervical back: Normal range of motion. Right lower leg: No edema. Left lower leg: No edema. Skin:     General: Skin is warm and dry. Neurological:      Comments: Awake and alert. Speech normal.  No facial droop. No drift in the upper or lower extremities. No dysmetria with finger-to-nose testing. No sensory deficits. No visual field deficits. When patient attempts to ambulate she appears quite ataxic and nearly falls. NIH stroke scale of 1 secondary to ataxia when attempting to walk        MDM  60-year-old female presenting with the above chief complaint. Vitals are stable. She has unremarkable exam.  No obvious cerebellar signs I can appreciate. No dysmetria. She has no nystagmus. Work-up will include a head CT and a CT of the head and neck. Her symptoms do sound like they are chronic, but given her age and how severe it was the need for an MRI will be determined based on her symptomatic improvement and ability to ambulate after initial work-up returns. EKG shows normal sinus rhythm. Rate is 90. Normal intervals. No axis deviation. No arrhythmias. No concerning ST elevations or depressions. Procedures    [facility] Consult for Admission  4:53 AM    ED Room Number: C03/C03  Patient Name and age:  Jo Ann Cortez 81 y.o.  female  Working Diagnosis:   1. Vertigo    2. Ataxia        COVID-19 Suspicion:  no  Sepsis present:  no  Reassessment needed: yes  Code Status:  Full Code  Readmission: no  Isolation Requirements:  no  Recommended Level of Care:  telemetry  Department: [place] [facility] - [phone]      Other: Patient with a history of vertigo but had a severe episode last night. Despite multiple doses of meclizine cannot ambulate. Is very ataxic and nearly falls down. No other neurologic deficits appreciated. CT scans are unremarkable. Is also being treated for UTI so I am giving her some cefdinir.

## 2022-10-03 NOTE — PROGRESS NOTES
Occupational Therapy Note  10/3/2022    OT eval order received and acknowledged and attempted at [phone] however pt stating she is still dizzy at this time requesting therapy come back after MRI is completed this afternoon. Will continue to follow pt and attempt OT eval at a later time as able.      Thank you,  Zbigniew Brown OTR/MARIBEL

## 2022-10-03 NOTE — ED NOTES
Pt and  updated on [facility] ETA of 0800 and assigned room at [facility].  is now leaving bedside and going to to sleep and will meet pt at [facility] once transferred over. Pt resting in bed with lights down. No complaints voiced at this time.

## 2022-10-04 ENCOUNTER — TRANSCRIBE ORDER (OUTPATIENT)
Dept: REGISTRATION | Age: 82
End: 2022-10-04

## 2022-10-04 ENCOUNTER — APPOINTMENT (OUTPATIENT)
Dept: NON INVASIVE DIAGNOSTICS | Age: 82
End: 2022-10-04
Attending: INTERNAL MEDICINE
Payer: MEDICARE

## 2022-10-04 VITALS
TEMPERATURE: 98.2 F | DIASTOLIC BLOOD PRESSURE: 69 MMHG | WEIGHT: 167.99 LBS | SYSTOLIC BLOOD PRESSURE: 138 MMHG | HEART RATE: 89 BPM | HEIGHT: 60 IN | OXYGEN SATURATION: 95 % | BODY MASS INDEX: 32.98 KG/M2 | RESPIRATION RATE: 16 BRPM

## 2022-10-04 LAB
ANION GAP SERPL CALC-SCNC: 6 MMOL/L (ref 5–15)
BASOPHILS # BLD: 0.1 K/UL (ref 0–0.1)
BASOPHILS NFR BLD: 1 % (ref 0–1)
BUN SERPL-MCNC: 14 MG/DL (ref 6–20)
BUN/CREAT SERPL: 17 (ref 12–20)
CALCIUM SERPL-MCNC: 8.9 MG/DL (ref 8.5–10.1)
CHLORIDE SERPL-SCNC: 108 MMOL/L (ref 97–108)
CO2 SERPL-SCNC: 24 MMOL/L (ref 21–32)
CREAT SERPL-MCNC: 0.81 MG/DL (ref 0.55–1.02)
DIFFERENTIAL METHOD BLD: ABNORMAL
ECHO AO ASC DIAM: 2.9 CM
ECHO AO ASCENDING AORTA INDEX: 1.68 CM/M2
ECHO AV AREA PEAK VELOCITY: 1.7 CM2
ECHO AV AREA/BSA PEAK VELOCITY: 1 CM2/M2
ECHO AV PEAK GRADIENT: 11 MMHG
ECHO AV PEAK VELOCITY: 1.7 M/S
ECHO AV VELOCITY RATIO: 0.59
ECHO LA DIAMETER INDEX: 1.5 CM/M2
ECHO LA DIAMETER: 2.6 CM
ECHO LA VOL 2C: 20 ML (ref 22–52)
ECHO LA VOL 4C: 18 ML (ref 22–52)
ECHO LA VOL BP: 20 ML (ref 22–52)
ECHO LA VOL/BSA BIPLANE: 12 ML/M2 (ref 16–34)
ECHO LA VOLUME AREA LENGTH: 21 ML
ECHO LA VOLUME INDEX A2C: 12 ML/M2 (ref 16–34)
ECHO LA VOLUME INDEX A4C: 10 ML/M2 (ref 16–34)
ECHO LA VOLUME INDEX AREA LENGTH: 12 ML/M2 (ref 16–34)
ECHO LV E' LATERAL VELOCITY: 7 CM/S
ECHO LV E' SEPTAL VELOCITY: 7 CM/S
ECHO LV EDV A2C: 43 ML
ECHO LV EDV A4C: 44 ML
ECHO LV EDV BP: 44 ML (ref 56–104)
ECHO LV EDV INDEX A4C: 25 ML/M2
ECHO LV EDV INDEX BP: 25 ML/M2
ECHO LV EDV NDEX A2C: 25 ML/M2
ECHO LV EJECTION FRACTION A2C: 65 %
ECHO LV EJECTION FRACTION A4C: 64 %
ECHO LV EJECTION FRACTION BIPLANE: 64 % (ref 55–100)
ECHO LV ESV A2C: 15 ML
ECHO LV ESV A4C: 16 ML
ECHO LV ESV BP: 16 ML (ref 19–49)
ECHO LV ESV INDEX A2C: 9 ML/M2
ECHO LV ESV INDEX A4C: 9 ML/M2
ECHO LV ESV INDEX BP: 9 ML/M2
ECHO LV FRACTIONAL SHORTENING: 21 % (ref 28–44)
ECHO LV INTERNAL DIMENSION DIASTOLE INDEX: 1.97 CM/M2
ECHO LV INTERNAL DIMENSION DIASTOLIC: 3.4 CM (ref 3.9–5.3)
ECHO LV INTERNAL DIMENSION SYSTOLIC INDEX: 1.56 CM/M2
ECHO LV INTERNAL DIMENSION SYSTOLIC: 2.7 CM
ECHO LV IVSD: 0.8 CM (ref 0.6–0.9)
ECHO LV MASS 2D: 71.9 G (ref 67–162)
ECHO LV MASS INDEX 2D: 41.6 G/M2 (ref 43–95)
ECHO LV POSTERIOR WALL DIASTOLIC: 0.8 CM (ref 0.6–0.9)
ECHO LV RELATIVE WALL THICKNESS RATIO: 0.47
ECHO LVOT AREA: 2.5 CM2
ECHO LVOT DIAM: 1.8 CM
ECHO LVOT MEAN GRADIENT: 3 MMHG
ECHO LVOT PEAK GRADIENT: 4 MMHG
ECHO LVOT PEAK VELOCITY: 1 M/S
ECHO LVOT STROKE VOLUME INDEX: 26.9 ML/M2
ECHO LVOT SV: 46.5 ML
ECHO LVOT VTI: 18.3 CM
ECHO MV A VELOCITY: 1.01 M/S
ECHO MV E DECELERATION TIME (DT): 251.5 MS
ECHO MV E VELOCITY: 0.69 M/S
ECHO MV E/A RATIO: 0.68
ECHO MV E/E' LATERAL: 9.86
ECHO MV E/E' RATIO (AVERAGED): 9.86
ECHO MV E/E' SEPTAL: 9.86
ECHO MV REGURGITANT PEAK GRADIENT: 104 MMHG
ECHO MV REGURGITANT PEAK VELOCITY: 5.1 M/S
ECHO PV MAX VELOCITY: 1.3 M/S
ECHO PV PEAK GRADIENT: 7 MMHG
ECHO RV FREE WALL PEAK S': 12 CM/S
ECHO RV INTERNAL DIMENSION: 2.9 CM
ECHO RV TAPSE: 1.3 CM (ref 1.7–?)
ECHO TV REGURGITANT MAX VELOCITY: 3.25 M/S
ECHO TV REGURGITANT PEAK GRADIENT: 42 MMHG
EOSINOPHIL # BLD: 0 K/UL (ref 0–0.4)
EOSINOPHIL NFR BLD: 0 % (ref 0–7)
ERYTHROCYTE [DISTWIDTH] IN BLOOD BY AUTOMATED COUNT: 15.3 % (ref 11.5–14.5)
ERYTHROCYTE [SEDIMENTATION RATE] IN BLOOD: 5 MM/HR (ref 0–30)
ETHANOL SERPL-MCNC: <10 MG/DL
FOLATE SERPL-MCNC: 27.4 NG/ML (ref 5–21)
GLUCOSE SERPL-MCNC: 102 MG/DL (ref 65–100)
HCT VFR BLD AUTO: 39.5 % (ref 35–47)
HGB BLD-MCNC: 12.8 G/DL (ref 11.5–16)
IMM GRANULOCYTES # BLD AUTO: 0 K/UL (ref 0–0.04)
IMM GRANULOCYTES NFR BLD AUTO: 0 % (ref 0–0.5)
LYMPHOCYTES # BLD: 2.3 K/UL (ref 0.8–3.5)
LYMPHOCYTES NFR BLD: 24 % (ref 12–49)
MAGNESIUM SERPL-MCNC: 2.3 MG/DL (ref 1.6–2.4)
MCH RBC QN AUTO: 28.8 PG (ref 26–34)
MCHC RBC AUTO-ENTMCNC: 32.4 G/DL (ref 30–36.5)
MCV RBC AUTO: 89 FL (ref 80–99)
MONOCYTES # BLD: 0.6 K/UL (ref 0–1)
MONOCYTES NFR BLD: 7 % (ref 5–13)
NEUTS SEG # BLD: 6.5 K/UL (ref 1.8–8)
NEUTS SEG NFR BLD: 68 % (ref 32–75)
NRBC # BLD: 0 K/UL (ref 0–0.01)
NRBC BLD-RTO: 0 PER 100 WBC
PLATELET # BLD AUTO: 243 K/UL (ref 150–400)
PMV BLD AUTO: 10.7 FL (ref 8.9–12.9)
POTASSIUM SERPL-SCNC: 4.2 MMOL/L (ref 3.5–5.1)
RBC # BLD AUTO: 4.44 M/UL (ref 3.8–5.2)
SODIUM SERPL-SCNC: 138 MMOL/L (ref 136–145)
TSH SERPL DL<=0.05 MIU/L-ACNC: 1.21 UIU/ML (ref 0.36–3.74)
VIT B12 SERPL-MCNC: 843 PG/ML (ref 193–986)
WBC # BLD AUTO: 9.6 K/UL (ref 3.6–11)

## 2022-10-04 PROCEDURE — 74011000250 HC RX REV CODE- 250: Performed by: INTERNAL MEDICINE

## 2022-10-04 PROCEDURE — G0378 HOSPITAL OBSERVATION PER HR: HCPCS

## 2022-10-04 PROCEDURE — 97116 GAIT TRAINING THERAPY: CPT

## 2022-10-04 PROCEDURE — 74011250636 HC RX REV CODE- 250/636: Performed by: INTERNAL MEDICINE

## 2022-10-04 PROCEDURE — 82607 VITAMIN B-12: CPT

## 2022-10-04 PROCEDURE — 97165 OT EVAL LOW COMPLEX 30 MIN: CPT

## 2022-10-04 PROCEDURE — 97161 PT EVAL LOW COMPLEX 20 MIN: CPT

## 2022-10-04 PROCEDURE — 36415 COLL VENOUS BLD VENIPUNCTURE: CPT

## 2022-10-04 PROCEDURE — 84443 ASSAY THYROID STIM HORMONE: CPT

## 2022-10-04 PROCEDURE — 93306 TTE W/DOPPLER COMPLETE: CPT | Performed by: STUDENT IN AN ORGANIZED HEALTH CARE EDUCATION/TRAINING PROGRAM

## 2022-10-04 PROCEDURE — 97530 THERAPEUTIC ACTIVITIES: CPT

## 2022-10-04 PROCEDURE — 74011250637 HC RX REV CODE- 250/637: Performed by: EMERGENCY MEDICINE

## 2022-10-04 PROCEDURE — 82077 ASSAY SPEC XCP UR&BREATH IA: CPT

## 2022-10-04 PROCEDURE — 83735 ASSAY OF MAGNESIUM: CPT

## 2022-10-04 PROCEDURE — 85652 RBC SED RATE AUTOMATED: CPT

## 2022-10-04 PROCEDURE — 82746 ASSAY OF FOLIC ACID SERUM: CPT

## 2022-10-04 PROCEDURE — 93306 TTE W/DOPPLER COMPLETE: CPT

## 2022-10-04 PROCEDURE — 97535 SELF CARE MNGMENT TRAINING: CPT

## 2022-10-04 PROCEDURE — 85025 COMPLETE CBC W/AUTO DIFF WBC: CPT

## 2022-10-04 PROCEDURE — 96372 THER/PROPH/DIAG INJ SC/IM: CPT

## 2022-10-04 PROCEDURE — 80048 BASIC METABOLIC PNL TOTAL CA: CPT

## 2022-10-04 PROCEDURE — 74011250637 HC RX REV CODE- 250/637: Performed by: INTERNAL MEDICINE

## 2022-10-04 RX ORDER — LEVOTHYROXINE SODIUM 88 UG/1
88 TABLET ORAL
Status: DISCONTINUED | OUTPATIENT
Start: 2022-10-04 | End: 2022-10-04 | Stop reason: HOSPADM

## 2022-10-04 RX ORDER — MECLIZINE HCL 12.5 MG 12.5 MG/1
12.5 TABLET ORAL ONCE
Status: COMPLETED | OUTPATIENT
Start: 2022-10-04 | End: 2022-10-04

## 2022-10-04 RX ORDER — CEFDINIR 300 MG/1
300 CAPSULE ORAL EVERY 12 HOURS
Qty: 10 CAPSULE | Refills: 0 | Status: SHIPPED | OUTPATIENT
Start: 2022-10-04 | End: 2022-10-09

## 2022-10-04 RX ORDER — MECLIZINE HCL 12.5 MG 12.5 MG/1
12.5 TABLET ORAL
Qty: 30 TABLET | Refills: 0 | Status: SHIPPED | OUTPATIENT
Start: 2022-10-04 | End: 2022-11-03

## 2022-10-04 RX ADMIN — MECLIZINE 12.5 MG: 12.5 TABLET ORAL at 13:36

## 2022-10-04 RX ADMIN — ENOXAPARIN SODIUM 40 MG: 100 INJECTION SUBCUTANEOUS at 09:32

## 2022-10-04 RX ADMIN — ASPIRIN 81 MG: 81 TABLET, CHEWABLE ORAL at 09:32

## 2022-10-04 RX ADMIN — Medication 10 ML: at 06:09

## 2022-10-04 RX ADMIN — SODIUM CHLORIDE, POTASSIUM CHLORIDE, SODIUM LACTATE AND CALCIUM CHLORIDE 75 ML/HR: 600; 310; 30; 20 INJECTION, SOLUTION INTRAVENOUS at 06:20

## 2022-10-04 RX ADMIN — CEFDINIR 300 MG: 300 CAPSULE ORAL at 09:32

## 2022-10-04 RX ADMIN — MECLIZINE 12.5 MG: 12.5 TABLET ORAL at 09:32

## 2022-10-04 RX ADMIN — LEVOTHYROXINE SODIUM 88 MCG: 0.09 TABLET ORAL at 13:38
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
## 2022-10-04 NOTE — DISCHARGE INSTRUCTIONS
HOSPITALIST DISCHARGE INSTRUCTIONS  NAME: Isaura Gan   :  1940   MRN:  [id number]     Date/Time:  10/4/2022 12:20 PM    ADMIT DATE: 10/3/2022     DISCHARGE DATE: 10/4/2022     ADMITTING DIAGNOSIS:  Dizziness   Vertigo     DISCHARGE DIAGNOSIS:  As above     MEDICATIONS:     It is important that you take the medication exactly as they are prescribed. Keep your medication in the bottles provided by the pharmacist and keep a list of the medication names, dosages, and times to be taken in your wallet. Do not take other medications without consulting your doctor. Pain Management: per above medications    What to do at Home    Recommended diet:  Regular Diet    Recommended activity: Activity as tolerated    If you experience any of the following symptoms then please call your primary care physician or return to the emergency room if you cannot get hold of your doctor:  Fever, chills, nausea, vomiting, diarrhea, change in mentation, falling, bleeding, shortness of breath, chest pain     Information obtained by :  I understand that if any problems occur once I am at home I am to contact my physician. I understand and acknowledge receipt of the instructions indicated above.                                                                                                                                            Physician's or R.N.'s Signature                                                                  Date/Time                                                                                                                                              Patient or Representative Signature                                                          Date/Time
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
## 2022-10-04 NOTE — PROGRESS NOTES
Problem: Activity Intolerance  Goal: *Oxygen saturation during activity within specified parameters  Outcome: Progressing Towards Goal  Goal: *Able to remain out of bed as prescribed  Outcome: Progressing Towards Goal     Problem: Falls - Risk of  Goal: *Absence of Falls  Description: Document Daniela Hua Fall Risk and appropriate interventions in the flowsheet.   Outcome: Progressing Towards Goal  Note: Fall Risk Interventions:  Mobility Interventions: Bed/chair exit alarm, Patient to call before getting OOB         Medication Interventions: Bed/chair exit alarm, Patient to call before getting OOB, Teach patient to arise slowly    Elimination Interventions: Call light in reach, Bed/chair exit alarm    History of Falls Interventions: Bed/chair exit alarm         Problem: Patient Education: Go to Patient Education Activity  Goal: Patient/Family Education  Outcome: Progressing Towards Goal     Problem: Nutrition Deficit  Goal: *Optimize nutritional status  Outcome: Progressing Towards Goal

## 2022-10-04 NOTE — PROGRESS NOTES
Spiritual Care Partner Volunteer visited patient at [address] in [facility] 4M POST SURG ORT 1 on 10/4/2022   Documented by:  Omar Rascon, MS., [facility]  Page a  [phone]   ]

## 2022-10-04 NOTE — PROGRESS NOTES
1420: Discussed discharge instructions and summary with patient. Patient verbalized understanding of instructions and medications. Patient aware that prescriptions were sent electronically to patient's usual pharmacy. Handed patient physical therapy script. Patient signed AVS. Opportunity for questions/clarification provided. VSS. IV removed with no complication. Pt took all belongings with them.

## 2022-10-04 NOTE — PROGRESS NOTES
10/4/2022  2:23 PM  Care Management Progress Note      ICD-10-CM ICD-9-CM    1. Vertigo  R42 780.4       2. Ataxia  R27.0 781.3           RUR:  NA - OBS  Risk Level: [x]Low []Moderate []High  Value-based purchasing: [] Yes [x] No  Bundle patient: [] Yes [x] No   Specify:     Transition of care plan:  Discharge order submitted. Pt has medically cleared. Home with continuation of OP PT. Outpatient follow-up. Pt's family to transport. Care Management Interventions  PCP Verified by CM: Yes (NO PCP)  Mode of Transport at Discharge:  Other (see comment) (family)  Transition of Care Consult (CM Consult): Discharge Planning  MyChart Signup: No  Discharge Durable Medical Equipment: No  Physical Therapy Consult: Yes  Occupational Therapy Consult: Yes  Speech Therapy Consult: No  Support Systems: Spouse/Significant Other, Child(juan c) (lives with ; has children who live locally)  Confirm Follow Up Transport: Self  Discharge Location  Patient Expects to be Discharged to[de-identified] Home with family assistance

## 2022-10-06 LAB
ATRIAL RATE: 90 BPM
CALCULATED P AXIS, ECG09: 60 DEGREES
CALCULATED R AXIS, ECG10: 30 DEGREES
CALCULATED T AXIS, ECG11: 43 DEGREES
DIAGNOSIS, 93000: NORMAL
P-R INTERVAL, ECG05: 156 MS
Q-T INTERVAL, ECG07: 362 MS
QRS DURATION, ECG06: 76 MS
QTC CALCULATION (BEZET), ECG08: 442 MS
VENTRICULAR RATE, ECG03: 90 BPM

## 2022-10-07 ENCOUNTER — HOSPITAL ENCOUNTER (EMERGENCY)
Age: 82
Discharge: HOME | End: 2022-10-07
Attending: EMERGENCY MEDICINE
Payer: MEDICARE

## 2022-10-07 VITALS
WEIGHT: 165 LBS | OXYGEN SATURATION: 100 % | SYSTOLIC BLOOD PRESSURE: 165 MMHG | HEIGHT: 60 IN | BODY MASS INDEX: 32.39 KG/M2 | TEMPERATURE: 98 F | HEART RATE: 82 BPM | RESPIRATION RATE: 16 BRPM | DIASTOLIC BLOOD PRESSURE: 80 MMHG

## 2022-10-07 DIAGNOSIS — K59.00 CONSTIPATION, UNSPECIFIED CONSTIPATION TYPE: Primary | ICD-10-CM

## 2022-10-07 DIAGNOSIS — K56.41 FECAL IMPACTION (HCC): ICD-10-CM

## 2022-10-07 PROCEDURE — 99282 EMERGENCY DEPT VISIT SF MDM: CPT

## 2022-10-07 NOTE — DISCHARGE INSTRUCTIONS
Return to the emergency department with any new or worsening symptoms. In the meantime I recommend you take 1 scoop of MiraLAX a day until you are having normal bowel movements. Please follow-up with your primary care doctor.

## 2022-10-07 NOTE — ED NOTES
Enema given to patient at this time, pt lying on side; enema administered, pt instructed to hold enema solution in as long as can, chucks placed under patient along with naina eng; bedside commode set up next to bed, yellow socks on patient, instructed patient to call when feels like has to attempt to get on bedside commode.

## 2022-10-07 NOTE — ED NOTES
Bedside and Verbal shift change report given to [address] [place] [address] (oncoming nurse) by Rosamaria Garcia (offgoing nurse). Report included the following information SBAR, ED Summary and MAR.
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
## 2022-10-07 NOTE — ED TRIAGE NOTES
Pt arrives with the c.c. of constipation; pt reports last bowel movement was on Tuesday. Pt has not tried any over the counter medication for problem.  Pt is currently taking abx for UTI

## 2023-05-03 ENCOUNTER — TRANSCRIBE ORDER (OUTPATIENT)
Dept: SCHEDULING | Age: 83
End: 2023-05-03

## 2023-05-03 DIAGNOSIS — M81.0 AGE-RELATED OSTEOPOROSIS WITHOUT CURRENT PATHOLOGICAL FRACTURE: Primary | ICD-10-CM

## 2023-05-19 ENCOUNTER — HOSPITAL ENCOUNTER (OUTPATIENT)
Facility: HOSPITAL | Age: 83
End: 2023-05-19
Payer: MEDICARE

## 2023-05-19 DIAGNOSIS — M81.0 AGE-RELATED OSTEOPOROSIS WITHOUT CURRENT PATHOLOGICAL FRACTURE: ICD-10-CM

## 2023-05-19 PROCEDURE — 77080 DXA BONE DENSITY AXIAL: CPT

## 2023-05-21 RX ORDER — ASPIRIN 81 MG/1
81 TABLET, CHEWABLE ORAL DAILY
COMMUNITY

## 2023-05-21 RX ORDER — PANTOPRAZOLE SODIUM 40 MG/1
TABLET, DELAYED RELEASE ORAL
COMMUNITY
Start: 2019-07-07

## 2023-05-21 RX ORDER — LEVOTHYROXINE SODIUM 88 UG/1
88 TABLET ORAL
COMMUNITY

## 2023-05-21 RX ORDER — SENNOSIDES 8.6 MG
650 CAPSULE ORAL EVERY 8 HOURS
COMMUNITY

## 2023-11-02 ENCOUNTER — HOSPITAL ENCOUNTER (OUTPATIENT)
Facility: HOSPITAL | Age: 83
Discharge: HOME | End: 2023-11-02
Attending: ORTHOPAEDIC SURGERY
Payer: MEDICARE

## 2023-11-02 DIAGNOSIS — M54.50 MIDLINE LOW BACK PAIN, UNSPECIFIED CHRONICITY, UNSPECIFIED WHETHER SCIATICA PRESENT: ICD-10-CM

## 2023-11-02 DIAGNOSIS — M51.36 DEGENERATION OF LUMBAR INTERVERTEBRAL DISC: ICD-10-CM

## 2023-11-02 PROCEDURE — 72148 MRI LUMBAR SPINE W/O DYE: CPT

## 2023-12-06 ENCOUNTER — TELEPHONE (OUTPATIENT)
Facility: CLINIC | Age: 83
End: 2023-12-06

## 2023-12-06 NOTE — TELEPHONE ENCOUNTER
----- Message from [employer]  sent at 12/5/2023  3:02 PM EST -----  Subject: Appointment Request    Reason for Call: New Patient/New to Provider Appointment needed: New   Patient Request Appointment    QUESTIONS    Reason for appointment request? No appointments available during search     Additional Information for Provider? new patient would like to see a new   provider at [facility]-her spouse Jose M Falk will be seeing Lindy Tijerina   needs a PCP so she can get a CT scan since she has a cyst on her abdomen  ---------------------------------------------------------------------------  --------------  [address]  [phone]; OK to leave message on voicemail  ---------------------------------------------------------------------------  --------------  SCRIPT ANSWERS

## 2024-01-08 ENCOUNTER — TRANSCRIBE ORDERS (OUTPATIENT)
Facility: HOSPITAL | Age: 84
End: 2024-01-08

## 2024-01-08 DIAGNOSIS — R10.9 STOMACH ACHE: Primary | ICD-10-CM

## 2024-01-08 DIAGNOSIS — N28.1 ACQUIRED CYST OF KIDNEY: ICD-10-CM

## 2024-01-15 ENCOUNTER — HOSPITAL ENCOUNTER (OUTPATIENT)
Facility: HOSPITAL | Age: 84
Discharge: HOME | End: 2024-01-18
Payer: MEDICARE

## 2024-01-15 DIAGNOSIS — N28.1 ACQUIRED CYST OF KIDNEY: ICD-10-CM

## 2024-01-15 DIAGNOSIS — R10.9 STOMACH ACHE: ICD-10-CM

## 2024-01-15 LAB — CREAT BLD-MCNC: 0.8 MG/DL (ref 0.6–1.3)

## 2024-01-15 PROCEDURE — 6360000004 HC RX CONTRAST MEDICATION

## 2024-01-15 PROCEDURE — 74177 CT ABD & PELVIS W/CONTRAST: CPT

## 2024-01-15 PROCEDURE — 82565 ASSAY OF CREATININE: CPT

## 2024-01-15 RX ADMIN — IOPAMIDOL 100 ML: 755 INJECTION, SOLUTION INTRAVENOUS at 14:04

## 2024-10-06 SDOH — HEALTH STABILITY: PHYSICAL HEALTH: ON AVERAGE, HOW MANY MINUTES DO YOU ENGAGE IN EXERCISE AT THIS LEVEL?: 0 MIN

## 2024-10-06 SDOH — HEALTH STABILITY: PHYSICAL HEALTH: ON AVERAGE, HOW MANY DAYS PER WEEK DO YOU ENGAGE IN MODERATE TO STRENUOUS EXERCISE (LIKE A BRISK WALK)?: 1 DAY

## 2024-10-07 ENCOUNTER — OFFICE VISIT (OUTPATIENT)
Facility: CLINIC | Age: 84
End: 2024-10-07
Payer: MEDICARE

## 2024-10-07 VITALS
TEMPERATURE: 97.4 F | WEIGHT: 167 LBS | DIASTOLIC BLOOD PRESSURE: 74 MMHG | BODY MASS INDEX: 32.79 KG/M2 | HEART RATE: 76 BPM | HEIGHT: 60 IN | SYSTOLIC BLOOD PRESSURE: 120 MMHG | OXYGEN SATURATION: 97 %

## 2024-10-07 DIAGNOSIS — K21.9 GASTROESOPHAGEAL REFLUX DISEASE WITHOUT ESOPHAGITIS: ICD-10-CM

## 2024-10-07 DIAGNOSIS — Z00.00 ENCOUNTER FOR MEDICAL EXAMINATION TO ESTABLISH CARE: Primary | ICD-10-CM

## 2024-10-07 DIAGNOSIS — E03.9 ACQUIRED HYPOTHYROIDISM: ICD-10-CM

## 2024-10-07 DIAGNOSIS — E55.9 VITAMIN D DEFICIENCY: ICD-10-CM

## 2024-10-07 DIAGNOSIS — B37.2 CANDIDAL SKIN INFECTION: ICD-10-CM

## 2024-10-07 PROCEDURE — G8417 CALC BMI ABV UP PARAM F/U: HCPCS | Performed by: FAMILY MEDICINE

## 2024-10-07 PROCEDURE — G8484 FLU IMMUNIZE NO ADMIN: HCPCS | Performed by: FAMILY MEDICINE

## 2024-10-07 PROCEDURE — 99203 OFFICE O/P NEW LOW 30 MIN: CPT | Performed by: FAMILY MEDICINE

## 2024-10-07 PROCEDURE — G8399 PT W/DXA RESULTS DOCUMENT: HCPCS | Performed by: FAMILY MEDICINE

## 2024-10-07 PROCEDURE — 1090F PRES/ABSN URINE INCON ASSESS: CPT | Performed by: FAMILY MEDICINE

## 2024-10-07 PROCEDURE — 1036F TOBACCO NON-USER: CPT | Performed by: FAMILY MEDICINE

## 2024-10-07 PROCEDURE — 1123F ACP DISCUSS/DSCN MKR DOCD: CPT | Performed by: FAMILY MEDICINE

## 2024-10-07 PROCEDURE — G8427 DOCREV CUR MEDS BY ELIG CLIN: HCPCS | Performed by: FAMILY MEDICINE

## 2024-10-07 RX ORDER — NYSTATIN 100000 U/G
CREAM TOPICAL
Qty: 30 G | Refills: 2 | Status: SHIPPED | OUTPATIENT
Start: 2024-10-07

## 2024-10-07 RX ORDER — METHOCARBAMOL 750 MG/1
750 TABLET, FILM COATED ORAL EVERY 4 HOURS
COMMUNITY
Start: 2024-09-06

## 2024-10-07 RX ORDER — NAPROXEN SODIUM 220 MG
TABLET ORAL
COMMUNITY

## 2024-10-07 SDOH — ECONOMIC STABILITY: FOOD INSECURITY: WITHIN THE PAST 12 MONTHS, THE FOOD YOU BOUGHT JUST DIDN'T LAST AND YOU DIDN'T HAVE MONEY TO GET MORE.: NEVER TRUE

## 2024-10-07 SDOH — ECONOMIC STABILITY: FOOD INSECURITY: WITHIN THE PAST 12 MONTHS, YOU WORRIED THAT YOUR FOOD WOULD RUN OUT BEFORE YOU GOT MONEY TO BUY MORE.: NEVER TRUE

## 2024-10-07 SDOH — ECONOMIC STABILITY: INCOME INSECURITY: HOW HARD IS IT FOR YOU TO PAY FOR THE VERY BASICS LIKE FOOD, HOUSING, MEDICAL CARE, AND HEATING?: NOT HARD AT ALL

## 2024-10-07 ASSESSMENT — PATIENT HEALTH QUESTIONNAIRE - PHQ9
SUM OF ALL RESPONSES TO PHQ QUESTIONS 1-9: 0
SUM OF ALL RESPONSES TO PHQ QUESTIONS 1-9: 0
1. LITTLE INTEREST OR PLEASURE IN DOING THINGS: NOT AT ALL
SUM OF ALL RESPONSES TO PHQ9 QUESTIONS 1 & 2: 0
SUM OF ALL RESPONSES TO PHQ QUESTIONS 1-9: 0
2. FEELING DOWN, DEPRESSED OR HOPELESS: NOT AT ALL
SUM OF ALL RESPONSES TO PHQ QUESTIONS 1-9: 0

## 2024-10-07 NOTE — PROGRESS NOTES
[facility]      HPI: Pt is a 84 y.o. female who presents for establish care.     Hypothyroidism: Takes levothyroxine without issues. She had labs done with Endocrinology a few months ago.     GERD: Takes Protonix as needed and rarely has symptoms.     Vitamin D deficiency: Takes vitamin D 5000IU daily.     Rash: Under her pannus. Comes and goes. Has been treated with a topical antifungal cream in the past which has helped.     Vertigo: She sometimes has problems with this but is doing much better lately. She has done PT Balance therapy in the past which was helpful and she is thinking about doing that again.       History reviewed. No pertinent past medical history.    History reviewed. No pertinent family history.    Social History     Tobacco Use    Smoking status: Never    Smokeless tobacco: Never   Substance Use Topics    Alcohol use: Yes    Drug use: Never       ROS:  Per HPI    PE:  /74 (Site: Left Upper Arm, Position: Sitting)   Pulse 76   Temp 97.4 °F (36.3 °C) (Temporal)   Ht 1.511 m (4' 11.5\")   Wt 75.8 kg (167 lb)   SpO2 97%   BMI 33.17 kg/m²   Gen: Pt sitting in chair, in NAD  Head: Normocephalic, atraumatic  Eyes: Sclera anicteric, EOM grossly intact, PERRL  Ears: TM's pearly with good light reflex b/l  Nose: Normal nasal mucosa  Throat: MMM, normal lips, tongue, teeth and gums  Neck: Supple, no LAD, no thyromegaly or carotid bruits  CVS: Normal S1, S2  Resp: CTAB, no wheezes or rales  Extrem: Atraumatic, no cyanosis or edema  Pulses: 2+   Skin: Warm, dry  Neuro: Alert, oriented, appropriate      A/P:     ICD-10-CM    1. Encounter for medical examination to establish care  Z00.00       2. Acquired hypothyroidism  E03.9       3. Gastroesophageal reflux disease without esophagitis  K21.9       4. Vitamin D deficiency  E55.9       5. Candidal skin infection  B37.2 nystatin (MYCOSTATIN) 287440 UNIT/GM cream         1. Encounter for medical examination to establish care    2.

## 2024-10-07 NOTE — PROGRESS NOTES
Chief Complaint   Patient presents with    New Patient     \"Have you been to the ER, urgent care clinic since your last visit?  Hospitalized since your last visit?\"    NO    “Have you seen or consulted any other health care providers outside of [facility] since your last visit?”    NO     /74 (Site: Left Upper Arm, Position: Sitting)   Pulse 76   Temp 97.4 °F (36.3 °C) (Temporal)   Ht 1.511 m (4' 11.5\")   Wt 75.8 kg (167 lb)   SpO2 97%   BMI 33.17 kg/m²    PHQ-9 Total Score: 0 (10/7/2024  2:10 PM)     Patient-Entered Cms Msp: Part I       Question 10/6/2024  4:27 PM EDT - Filed by Patient    Medicare requires that we periodically ask the following questions.       Are you receiving Black Lung (BL) benefits? No    Are the services to be paid by a government research program? No    Are you entitled to benefits through the Department of Veterans Affairs (DVA)? No    Was the illness/injury due to a work-related accident/condition? No    Was the illness/injury due to a non-work-related accident? No    Are you entitled to Medicare based on:     Age? No    Disability? No    End-stage renal disease (ESRD)?               Patient-Entered Msp: Age-Disability-Esrd Primary Branch Calculation (range: 0 - 5) 0    Thank you for answering this questionnaire.             Yoselin Loving-In New Patient Additional Questions       Question 10/6/2024  4:33 PM EDT - Filed by Patient    Please list any providers you have seen in the last 12 months and for what reason       In the past 12 Months have you had any of the following symptoms?     Headaches No    Fainting or passing out       Sudden loss of vision, strength, or inability to speak No    Hearing loss or ringing in ear(s) Yes    Nosebleeds       Coughing for more than 2-4 weeks No    Coughing up blood       Shortness of breath or wheezing No    Swelling of feet or ankles       Chest pain, chest pressure or heaviness       Irregular heartbeat or suden

## 2025-04-15 ENCOUNTER — HOSPITAL ENCOUNTER (OUTPATIENT)
Facility: HOSPITAL | Age: 85
Discharge: HOME | End: 2025-04-18
Attending: INTERNAL MEDICINE
Payer: MEDICARE

## 2025-04-15 DIAGNOSIS — M81.0 AGE-RELATED OSTEOPOROSIS WITHOUT CURRENT PATHOLOGICAL FRACTURE: ICD-10-CM

## 2025-04-15 PROCEDURE — 77080 DXA BONE DENSITY AXIAL: CPT

## 2025-05-21 ENCOUNTER — OFFICE VISIT (OUTPATIENT)
Facility: CLINIC | Age: 85
End: 2025-05-21
Payer: MEDICARE

## 2025-05-21 VITALS
DIASTOLIC BLOOD PRESSURE: 78 MMHG | HEIGHT: 60 IN | WEIGHT: 166 LBS | TEMPERATURE: 97.4 F | SYSTOLIC BLOOD PRESSURE: 128 MMHG | OXYGEN SATURATION: 98 % | BODY MASS INDEX: 32.59 KG/M2 | RESPIRATION RATE: 20 BRPM | HEART RATE: 76 BPM

## 2025-05-21 DIAGNOSIS — E03.9 ACQUIRED HYPOTHYROIDISM: ICD-10-CM

## 2025-05-21 DIAGNOSIS — Z82.0 FAMILY HISTORY OF PARKINSON DISEASE: ICD-10-CM

## 2025-05-21 DIAGNOSIS — R29.898 WEAKNESS OF BOTH LOWER EXTREMITIES: Primary | ICD-10-CM

## 2025-05-21 DIAGNOSIS — M48.061 SPINAL STENOSIS OF LUMBAR REGION, UNSPECIFIED WHETHER NEUROGENIC CLAUDICATION PRESENT: ICD-10-CM

## 2025-05-21 PROBLEM — E55.9 VITAMIN D DEFICIENCY: Status: ACTIVE | Noted: 2025-05-21

## 2025-05-21 PROBLEM — I73.00 RAYNAUD PHENOMENON: Status: ACTIVE | Noted: 2025-05-21

## 2025-05-21 PROBLEM — K21.9 GASTROESOPHAGEAL REFLUX DISEASE WITHOUT ESOPHAGITIS: Status: ACTIVE | Noted: 2025-05-21

## 2025-05-21 PROCEDURE — 1159F MED LIST DOCD IN RCRD: CPT | Performed by: FAMILY MEDICINE

## 2025-05-21 PROCEDURE — G8427 DOCREV CUR MEDS BY ELIG CLIN: HCPCS | Performed by: FAMILY MEDICINE

## 2025-05-21 PROCEDURE — 1125F AMNT PAIN NOTED PAIN PRSNT: CPT | Performed by: FAMILY MEDICINE

## 2025-05-21 PROCEDURE — G8417 CALC BMI ABV UP PARAM F/U: HCPCS | Performed by: FAMILY MEDICINE

## 2025-05-21 PROCEDURE — 1036F TOBACCO NON-USER: CPT | Performed by: FAMILY MEDICINE

## 2025-05-21 PROCEDURE — G8399 PT W/DXA RESULTS DOCUMENT: HCPCS | Performed by: FAMILY MEDICINE

## 2025-05-21 PROCEDURE — 1090F PRES/ABSN URINE INCON ASSESS: CPT | Performed by: FAMILY MEDICINE

## 2025-05-21 PROCEDURE — 1123F ACP DISCUSS/DSCN MKR DOCD: CPT | Performed by: FAMILY MEDICINE

## 2025-05-21 PROCEDURE — 99214 OFFICE O/P EST MOD 30 MIN: CPT | Performed by: FAMILY MEDICINE

## 2025-05-21 SDOH — ECONOMIC STABILITY: FOOD INSECURITY: WITHIN THE PAST 12 MONTHS, THE FOOD YOU BOUGHT JUST DIDN'T LAST AND YOU DIDN'T HAVE MONEY TO GET MORE.: NEVER TRUE

## 2025-05-21 SDOH — ECONOMIC STABILITY: FOOD INSECURITY: WITHIN THE PAST 12 MONTHS, YOU WORRIED THAT YOUR FOOD WOULD RUN OUT BEFORE YOU GOT MONEY TO BUY MORE.: NEVER TRUE

## 2025-05-21 ASSESSMENT — PATIENT HEALTH QUESTIONNAIRE - PHQ9
SUM OF ALL RESPONSES TO PHQ QUESTIONS 1-9: 0
1. LITTLE INTEREST OR PLEASURE IN DOING THINGS: NOT AT ALL
SUM OF ALL RESPONSES TO PHQ QUESTIONS 1-9: 0
2. FEELING DOWN, DEPRESSED OR HOPELESS: NOT AT ALL
SUM OF ALL RESPONSES TO PHQ QUESTIONS 1-9: 0
SUM OF ALL RESPONSES TO PHQ QUESTIONS 1-9: 0

## 2025-05-21 NOTE — ASSESSMENT & PLAN NOTE
Orders:    [facility] - JelaniSavannah moore MD, Neurology, [place]

## 2025-05-21 NOTE — PROGRESS NOTES
The patient, Katja Hoff, identity was verified by name and MRN.  Chief Complaint   Patient presents with    Extremity Weakness     Weakness in both legs, unable to stand for very long. PT therapy. Past week, \"legs giving out\"      No LMP recorded.  /78 (BP Site: Right Upper Arm, Patient Position: Sitting, BP Cuff Size: Large Adult)   Pulse 76   Temp 97.4 °F (36.3 °C) (Temporal)   Resp 20   Ht 1.511 m (4' 11.5\")   Wt 75.3 kg (166 lb)   SpO2 98%   BMI 32.97 kg/m²       5/21/2025     9:20 AM   Amb Fall Risk Assessment and TUG Test   Do you feel unsteady or are you worried about falling?  yes   2 or more falls in past year? yes   Fall with injury in past year? no     PHQ-9 Total Score: 0 (5/21/2025  9:20 AM)    \"Have you been to the ER, urgent care clinic since your last visit?  Hospitalized since your last visit?\"    NO    “Have you seen or consulted any other health care providers outside our system since your last visit?”    NO             Social History     Substance and Sexual Activity   Sexual Activity Not on file     Medication list reviewed and active medications noted. Patient is taking medications as directed.  See documentation in medication activity.  Allergies: allergy list reviewed, no new allergies added    Patient-Entered Cms Msp: Part I       Question 5/20/2025  3:36 PM EDT - Filed by Patient    Medicare requires that we periodically ask the following questions.       Are you receiving Black Lung (BL) benefits? No    Are the services to be paid by a government research program? No    Are you entitled to benefits through the Department of Veterans Affairs (DVA)? No    Was the illness/injury due to a work-related accident/condition? No    Was the illness/injury due to a non-work-related accident? No    Are you entitled to Medicare based on:     Age? Yes    End-stage renal disease (ESRD)? No            Patient-Entered Msp: Age-Disability-Esrd Primary Branch Calculation (range: 0 - 5) 1    
normal.         Behavior: Behavior normal.           Assessment & Plan  Weakness of both lower extremities       Orders:    Solo De La Vega MD, Orthopedic Surgery (back, neck, spine), [place] ([facility]ibel [facility])    [facility] Savannah Mendoza MD, Neurology, [place]    Spinal stenosis of lumbar region, unspecified whether neurogenic claudication present       Orders:    Solo De La Vega MD, Orthopedic Surgery (back, neck, spine), [place] (Maribel Thomas)    Family history of Parkinson disease       Orders:    [facility] Savannah Mendoza MD, Neurology, [place]    Acquired hypothyroidism              Assessment & Plan  Leg weakness:  - Likely due to lumbar spinal stenosis; Parkinson's disease and arterial disease are on the differential but less likely.  - Metabolic panel normal, kidney function and electrolytes within normal limits, not anemic.  TSH, B12, vitamin D all within normal limits.  - Referrals to Dr. Jordan or Dr. Chinchilla (spinal surgeons) and [facility] neurology.  - Continue Aleve for pain management.    Raynaud's phenomenon:  - History of Raynaud's, no current medication.  - No leg swelling, occasional discoloration.  - Added to problem list, no medication prescribed.    Thyroid management:  - Thyroid levels normal in 03/2025 with levothyroxine.  - No changes needed, stable levels.  - Continued monitoring by Dr. Baldwin.    Family history of Parkinson's disease:  - Father diagnosed around age 78-79.  - Less likely cause of current symptoms.  - Referral to neurologist for further evaluation.  - Address patient's concerns about Parkinson's and MS.    Return for AWV, f/u. non fasting.             The patient (or guardian, if applicable) and other individuals in attendance with the patient were advised that Artificial Intelligence will be utilized during this visit to record, process the conversation to generate a clinical note and to support improvement of the AI technology. The patient (or guardian, if applicable)

## 2025-05-28 ENCOUNTER — TELEPHONE (OUTPATIENT)
Age: 85
End: 2025-05-28

## 2025-05-28 NOTE — TELEPHONE ENCOUNTER
Please check patient referral to see if she needs an EMG prior to her appt in Dec.  She has weakness in both legs, basically hips down. Pt. Does shuffle per .      Please contact spouse if an EMG is warranted.  Thanks.    [phone] Tha

## 2025-05-28 NOTE — TELEPHONE ENCOUNTER
Spoke with Tha and let him know that the referring provider didn't order an EMG. Once the patient sees neuro the provider will order it if necessary.

## 2025-06-25 ENCOUNTER — TRANSCRIBE ORDERS (OUTPATIENT)
Facility: HOSPITAL | Age: 85
End: 2025-06-25

## 2025-06-25 DIAGNOSIS — M51.362 DEGENERATION OF INTERVERTEBRAL DISC OF LUMBAR REGION WITH DISCOGENIC BACK PAIN AND LOWER EXTREMITY PAIN: Primary | ICD-10-CM

## 2025-06-25 DIAGNOSIS — M54.50 LUMBAR PAIN: ICD-10-CM

## 2025-07-17 ENCOUNTER — HOSPITAL ENCOUNTER (OUTPATIENT)
Facility: HOSPITAL | Age: 85
Discharge: HOME | End: 2025-07-20
Attending: ORTHOPAEDIC SURGERY
Payer: MEDICARE

## 2025-07-17 DIAGNOSIS — M51.362 DEGENERATION OF INTERVERTEBRAL DISC OF LUMBAR REGION WITH DISCOGENIC BACK PAIN AND LOWER EXTREMITY PAIN: ICD-10-CM

## 2025-07-17 DIAGNOSIS — M54.50 LUMBAR PAIN: ICD-10-CM

## 2025-07-17 PROCEDURE — 72148 MRI LUMBAR SPINE W/O DYE: CPT
